# Patient Record
Sex: FEMALE | Race: ASIAN | NOT HISPANIC OR LATINO | URBAN - METROPOLITAN AREA
[De-identification: names, ages, dates, MRNs, and addresses within clinical notes are randomized per-mention and may not be internally consistent; named-entity substitution may affect disease eponyms.]

---

## 2024-06-15 ENCOUNTER — EMERGENCY (EMERGENCY)
Facility: HOSPITAL | Age: 24
LOS: 1 days | Discharge: LEFT BEFORE DISCHARGE | End: 2024-06-15
Attending: EMERGENCY MEDICINE | Admitting: EMERGENCY MEDICINE
Payer: COMMERCIAL

## 2024-06-15 VITALS
OXYGEN SATURATION: 100 % | DIASTOLIC BLOOD PRESSURE: 65 MMHG | RESPIRATION RATE: 16 BRPM | SYSTOLIC BLOOD PRESSURE: 101 MMHG | HEART RATE: 74 BPM | TEMPERATURE: 98 F

## 2024-06-15 PROCEDURE — L9991: CPT

## 2024-06-15 RX ORDER — KETOROLAC TROMETHAMINE 30 MG/ML
30 SYRINGE (ML) INJECTION ONCE
Refills: 0 | Status: COMPLETED | OUTPATIENT
Start: 2024-06-15 | End: 2024-06-15

## 2024-06-15 RX ORDER — SODIUM CHLORIDE 9 MG/ML
1000 INJECTION INTRAMUSCULAR; INTRAVENOUS; SUBCUTANEOUS ONCE
Refills: 0 | Status: DISCONTINUED | OUTPATIENT
Start: 2024-06-15 | End: 2024-06-19

## 2024-06-15 NOTE — ED PROVIDER NOTE - NSFOLLOWUPINSTRUCTIONS_ED_ALL_ED_FT
You left without completing treatment.  You were seen for flank pain.  Ibuprofen 400mg up to 3x per day take with food.  Return to Emergency for any worsening pain, fever, nausea, vomiting, weakness, dizziness or any sigsn fo distress.

## 2024-06-15 NOTE — ED ADULT NURSE NOTE - NSFALLUNIVINTERV_ED_ALL_ED
Bed/Stretcher in lowest position, wheels locked, appropriate side rails in place/Call bell, personal items and telephone in reach/Instruct patient to call for assistance before getting out of bed/chair/stretcher/Non-slip footwear applied when patient is off stretcher/[place] to call system/Physically safe environment - no spills, clutter or unnecessary equipment/Purposeful proactive rounding/Room/bathroom lighting operational, light cord in reach

## 2024-06-15 NOTE — ED ADULT NURSE NOTE - OBJECTIVE STATEMENT
Pt c/o right flank pain x1 day, associated with nausea and vomiting. Hx migraine. A&Ox4, ambulatory. Breathing even and unlabored Pt in NAD at this time. Pt denies any abdominal pain or back pain at this time. UCG negative. Pt wishes to cancel treatment at this time due to insurance reasons. MD Padilla made aware.

## 2024-06-15 NOTE — ED ADULT TRIAGE NOTE - CHIEF COMPLAINT QUOTE
Pt c/o rt flank and RLQ pain beginning yesterday. Endorsing multiple episodes n/v, and cough. Denies fever, chills. denies pmhx.

## 2024-06-15 NOTE — ED PROVIDER NOTE - OBJECTIVE STATEMENT
pt. with flank pain, registered to be seen but now told that insurance not active or not covering ED visit, does not wish to stay.

## 2024-06-15 NOTE — ED PROVIDER NOTE - CLINICAL SUMMARY MEDICAL DECISION MAKING FREE TEXT BOX
23F c/o R flank pain, has hx of kidney stone and thinks she has kidney stone. At time of eval. pt. states pain much improved, no current N/V. Pt. realized that her insurance does not cover ED visit and does not wish to stay. Will attempt to find outpt. help that her insurance covers or return if worsens.